# Patient Record
Sex: FEMALE | Race: WHITE | Employment: OTHER | URBAN - METROPOLITAN AREA
[De-identification: names, ages, dates, MRNs, and addresses within clinical notes are randomized per-mention and may not be internally consistent; named-entity substitution may affect disease eponyms.]

---

## 2017-04-06 ENCOUNTER — TELEPHONE (OUTPATIENT)
Dept: RADIATION ONCOLOGY | Facility: HOSPITAL | Age: 72
End: 2017-04-06

## 2017-04-06 ENCOUNTER — TELEPHONE (OUTPATIENT)
Dept: HEMATOLOGY/ONCOLOGY | Facility: HOSPITAL | Age: 72
End: 2017-04-06

## 2017-04-06 NOTE — TELEPHONE ENCOUNTER
I spoke to patients  and provided him with central scheduling phone number to call to schedule patients MRI that she will have this month. When asked he stated his wife has not complained about headaches or dizziness.

## 2017-04-06 NOTE — TELEPHONE ENCOUNTER
PLEASE CALL PT'S SPOUSE CONNIE -[phone] REGARDING IT'S BEEN 6 MOS SINCE PT'S RADIATION TREATMENTS AND   PT NEEDS ANOTHER BRAIN SCAN. -MKV

## 2017-06-01 ENCOUNTER — APPOINTMENT (OUTPATIENT)
Dept: RADIATION ONCOLOGY | Facility: HOSPITAL | Age: 72
End: 2017-06-01
Attending: RADIOLOGY
Payer: MEDICARE

## 2017-08-30 DIAGNOSIS — D32.9 MENINGIOMA (HCC): Primary | ICD-10-CM

## 2017-09-07 ENCOUNTER — HOSPITAL ENCOUNTER (OUTPATIENT)
Dept: MRI IMAGING | Facility: HOSPITAL | Age: 72
Discharge: HOME | End: 2017-09-07
Attending: RADIOLOGY
Payer: MEDICARE

## 2017-09-07 DIAGNOSIS — D32.9 MENINGIOMA (HCC): ICD-10-CM

## 2017-09-07 LAB — CREAT BLD-MCNC: 0.7 MG/DL (ref 0.5–1.5)

## 2017-09-07 PROCEDURE — 82565 ASSAY OF CREATININE: CPT

## 2017-09-07 PROCEDURE — A9575 INJ GADOTERATE MEGLUMI 0.1ML: HCPCS | Performed by: RADIOLOGY

## 2017-09-07 PROCEDURE — 70553 MRI BRAIN STEM W/O & W/DYE: CPT | Performed by: RADIOLOGY

## 2017-09-15 ENCOUNTER — OFFICE VISIT (OUTPATIENT)
Dept: RADIATION ONCOLOGY | Facility: HOSPITAL | Age: 72
End: 2017-09-15
Attending: RADIOLOGY
Payer: MEDICARE

## 2017-09-15 VITALS
WEIGHT: 125.63 LBS | SYSTOLIC BLOOD PRESSURE: 132 MMHG | TEMPERATURE: 98 F | OXYGEN SATURATION: 96 % | DIASTOLIC BLOOD PRESSURE: 65 MMHG | HEART RATE: 82 BPM | RESPIRATION RATE: 16 BRPM | BODY MASS INDEX: 22 KG/M2

## 2017-09-15 DIAGNOSIS — D32.9 MENINGIOMA (HCC): Primary | ICD-10-CM

## 2017-09-15 PROCEDURE — 99211 OFF/OP EST MAY X REQ PHY/QHP: CPT

## 2017-09-15 NOTE — PATIENT INSTRUCTIONS
Call [facility] line at [phone] for a referral for Primary Care Physician and Neurologist.   Next MRI is due in 6 year. Call to schedule at [phone].   Then call Serg Mathias at [phone] to schedule a follow up appointment to see Dr. Jude Clark.

## 2017-09-15 NOTE — PROGRESS NOTES
Pt came in accompanied by her family. Medical history reviewed. Medications reviewed. Pt vss. Denies any pain. Occasional headache. States excedrin  Helps with headache. Pt states she is eating fine but food at [facility] is not that good.   Son will hav

## 2017-09-15 NOTE — PROGRESS NOTES
RADIATION ONCOLOGY NOTE    DATE OF VISIT: 9/15/2017    DIAGNOSIS:  Atypical meningioma, status post resection, and adjuvant Cyberknife for residual disease in 7/13 and  further salvage Cyberknife Radiosurgery to L posterior parietal recurrent dz in 12/2016 unfortunately passed away and pt was admitted to [facility] due to seizures as she was likely not taking her medications. She has been on Vimpat instead of her Keppra with control of her symptoms.   Her most recent MRI shows tx response as below and she is curre below and she is currently at [place] and has good quality of life. The son expressed concern with regard to high cost of Vimpat (lacosamine) and will look into financial assistance programs for the medications or will f/u with Neurology RE Keppra. the left frontal and anterior temporal lobes is noted and likely   postsurgical in nature. Foci of hemosiderin deposition along these areas of encephalomalacia are also noted.  There is mild presumed reactive dural thickening and hyper enhancement at the cr ischemia. CEREBELLUM: No edema, hemorrhage, mass, or acute infarction is seen. There is cerebellar folial expansion consistent with atrophy.     BRAINSTEM:    There is a 0.7 x 0.5 cm left petrous ridge/cerebellopontine angle extra-axial dural based enhanci extraconal left orbit and   also extends posteriorly into the left middle cranial fossa. Posterior and medial extension to involve the left optic canal and superior orbital fissure is noted with additional abutment of the anterior left cavernous sinus.  Res

## 2018-02-22 ENCOUNTER — HOSPITAL ENCOUNTER (OUTPATIENT)
Dept: MRI IMAGING | Facility: HOSPITAL | Age: 73
Discharge: HOME | End: 2018-02-22
Attending: RADIOLOGY
Payer: MEDICARE

## 2018-02-22 DIAGNOSIS — D32.9 MENINGIOMA (HCC): ICD-10-CM

## 2018-02-22 LAB — CREAT BLD-MCNC: 0.7 MG/DL (ref 0.5–1.5)

## 2018-02-22 PROCEDURE — A9575 INJ GADOTERATE MEGLUMI 0.1ML: HCPCS | Performed by: RADIOLOGY

## 2018-02-22 PROCEDURE — 82565 ASSAY OF CREATININE: CPT

## 2018-02-22 PROCEDURE — 70553 MRI BRAIN STEM W/O & W/DYE: CPT | Performed by: RADIOLOGY

## 2018-02-23 DIAGNOSIS — D32.9 MENINGIOMA (HCC): Primary | ICD-10-CM

## 2018-02-28 ENCOUNTER — TELEPHONE (OUTPATIENT)
Dept: RADIATION ONCOLOGY | Facility: HOSPITAL | Age: 73
End: 2018-02-28

## 2018-03-07 ENCOUNTER — OFFICE VISIT (OUTPATIENT)
Dept: RADIATION ONCOLOGY | Facility: HOSPITAL | Age: 73
End: 2018-03-07
Attending: RADIOLOGY
Payer: MEDICARE

## 2018-03-07 VITALS
RESPIRATION RATE: 16 BRPM | BODY MASS INDEX: 20 KG/M2 | TEMPERATURE: 98 F | DIASTOLIC BLOOD PRESSURE: 73 MMHG | SYSTOLIC BLOOD PRESSURE: 158 MMHG | OXYGEN SATURATION: 95 % | WEIGHT: 116 LBS | HEART RATE: 68 BPM

## 2018-03-07 PROCEDURE — 99211 OFF/OP EST MAY X REQ PHY/QHP: CPT

## 2018-03-07 NOTE — PROGRESS NOTES
Pt came in accompanied by her son Isidro Phillips. Pt is living closer to Isidro Phillips at a facility now. Medical history reviewed. Medications reviewed. Pt vss. Denies any pain or headaches. Denies any recent seizure activity. Slow steady gait noted.   Eating and drin

## 2018-03-07 NOTE — PROGRESS NOTES
RADIATION ONCOLOGY NOTE    DATE OF VISIT: 3/7/2018    DIAGNOSIS:  Atypical meningioma, status post resection, and adjuvant Cyberknife for residual disease in 7/13 and  further salvage Cyberknife Radiosurgery to L posterior parietal recurrent dz in 12/2016, radiographic surveillance with stable dz in the treated areas, with post surgery findings seen. She has good QOL at her assisted facility and is compliant with her anti-seizure meds.   Of note, she does not have a primary MD.  She will be see Dr. Geri Benson mouth every 6 (six) hours as needed for Pain. Disp:  Rfl:    folic acid (FOLVITE) 1 MG Oral Tab Take by mouth daily. Disp:  Rfl:    levetiracetam (KEPPRA) 1000 MG Oral Tab Take 1,000 mg by mouth 2 (two) times daily.    Disp:  Rfl:        ALLERGIES :   No Kn Please do not hesitate to contact me directly.     Susie Brewer MD  Radiation Oncology  [email or web address]  [phone].    3/7/2018      ==  Show images for MRI BRAIN (W+WO) ([id number])    Printable Result Report     Open Hard Copy Result Report (Order #[id number] - sphenoid wing that spans approximately 5.0 x 1.9 (AP by transverse). Extension into the left middle cranial fossa is noted as well as left orbital apex.    Intraorbital extension through the sphenoid bone and orbital roof is noted with abutment of the left identified but appear slightly less conspicuous and masslike. Dictated by (CST): Tolu Maradiaga MD on 2/22/2018 at 15:09       Approved by (CST): Tolu Maradiaga MD on 2/22/2018 at 15:26          =====  CONCLUSION:   1.  Postsurgical changes of left cr

## 2018-08-22 ENCOUNTER — HOSPITAL ENCOUNTER (OUTPATIENT)
Dept: MRI IMAGING | Facility: HOSPITAL | Age: 73
Discharge: HOME | End: 2018-08-22
Attending: RADIOLOGY
Payer: MEDICARE

## 2018-08-22 DIAGNOSIS — D32.9 MENINGIOMA (HCC): ICD-10-CM

## 2018-08-22 LAB — CREAT BLD-MCNC: 0.7 MG/DL (ref 0.5–1.5)

## 2018-08-22 PROCEDURE — 82565 ASSAY OF CREATININE: CPT

## 2018-08-22 PROCEDURE — A9576 INJ PROHANCE MULTIPACK: HCPCS | Performed by: RADIOLOGY

## 2018-08-22 PROCEDURE — 70553 MRI BRAIN STEM W/O & W/DYE: CPT | Performed by: RADIOLOGY

## 2019-05-16 ENCOUNTER — TELEPHONE (OUTPATIENT)
Dept: RADIATION ONCOLOGY | Facility: HOSPITAL | Age: 74
End: 2019-05-16

## 2019-05-16 NOTE — TELEPHONE ENCOUNTER
Multiple messages left for patient's son Geetha Michaels (5/2,5/9,5/15) to set up f/u appointment with Dr. Jude Clark.  Patient was also instructed to have MRI post treatment prior to f/u. No response from son.   Will inform Dr. Jude Clark.

## 2019-05-24 ENCOUNTER — TELEPHONE (OUTPATIENT)
Dept: RADIATION ONCOLOGY | Facility: HOSPITAL | Age: 74
End: 2019-05-24

## 2019-05-24 NOTE — TELEPHONE ENCOUNTER
Multiple calls ( 5/2, 5/9, 5/16, 5/24) to son Ned Dillon to schedule MRI and then a f/u with Dr. Jude ROBBINS each time with no response.

## 2019-06-04 ENCOUNTER — HOSPITAL ENCOUNTER (OUTPATIENT)
Dept: MRI IMAGING | Facility: HOSPITAL | Age: 74
Discharge: HOME | End: 2019-06-04
Attending: RADIOLOGY
Payer: MEDICARE

## 2019-06-04 DIAGNOSIS — D32.9 BENIGN NEOPLASM OF MENINGES (HCC): ICD-10-CM

## 2019-06-04 PROCEDURE — A9575 INJ GADOTERATE MEGLUMI 0.1ML: HCPCS | Performed by: RADIOLOGY

## 2019-06-04 PROCEDURE — 82565 ASSAY OF CREATININE: CPT

## 2019-06-04 PROCEDURE — 70553 MRI BRAIN STEM W/O & W/DYE: CPT | Performed by: RADIOLOGY

## 2019-06-05 ENCOUNTER — TELEPHONE (OUTPATIENT)
Dept: HEMATOLOGY/ONCOLOGY | Facility: HOSPITAL | Age: 74
End: 2019-06-05

## 2019-06-05 NOTE — TELEPHONE ENCOUNTER
lvmtcb for son Sesar Cowan to make f/u ck appt and review MRI.  [address] [facility]

## 2019-06-05 NOTE — TELEPHONE ENCOUNTER
----- Message from Mason Robert RN sent at 6/4/2019  1:27 PM CDT -----  Regarding: [facility] PT  Please call to schedule a follow up to review MRI results, yearly visit.

## 2019-06-12 ENCOUNTER — OFFICE VISIT (OUTPATIENT)
Dept: RADIATION ONCOLOGY | Facility: HOSPITAL | Age: 74
End: 2019-06-12
Attending: RADIOLOGY
Payer: MEDICARE

## 2019-06-12 VITALS
OXYGEN SATURATION: 98 % | TEMPERATURE: 98 F | WEIGHT: 91.81 LBS | RESPIRATION RATE: 16 BRPM | BODY MASS INDEX: 16 KG/M2 | SYSTOLIC BLOOD PRESSURE: 140 MMHG | HEART RATE: 70 BPM | DIASTOLIC BLOOD PRESSURE: 67 MMHG

## 2019-06-12 DIAGNOSIS — D32.9 MENINGIOMA (HCC): Primary | ICD-10-CM

## 2019-06-12 PROCEDURE — 99211 OFF/OP EST MAY X REQ PHY/QHP: CPT

## 2019-06-12 NOTE — PROGRESS NOTES
Pt came in accompanied by her son. Pt has lost 25 lbs since the last time she was seen in our office. Medical history reviewed. Medications reviewed. Pt vss.   Denies any pain, states sometimes she has headaches and takes her prescription tylenol for arash

## 2019-06-12 NOTE — PROGRESS NOTES
RADIATION ONCOLOGY NOTE    DATE OF VISIT: 6/12/2019    DIAGNOSIS:  Atypical meningioma, status post resection, and adjuvant Cyberknife for residual disease in 7/13 and  further salvage Cyberknife Radiosurgery to L posterior parietal recurrent dz in 12/2016 radiographic surveillance with stable dz in the treated areas, with post surgery findings seen. Of note, she reports having bowel resection but has decreased appetitie with progressive weight loss.   She is at a assisted facility and reports only eating he patient's clinical, radiographic, pathologic and laboratory studies.     ASSESSMENT/PLAN    70 yo with  Atypical meningioma, status post resection, and adjuvant Cyberknife for residual disease in 7/13 and  further salvage Cyberknife Radiosurgery to L poster Dictated by (CST): Aparna Porras MD on 6/04/2019 at 11:56     Approved by (CST): Aparna Porras MD on 6/04/2019 at 13:02

## 2020-06-08 ENCOUNTER — HOSPITAL ENCOUNTER (OUTPATIENT)
Dept: MRI IMAGING | Facility: HOSPITAL | Age: 75
Discharge: HOME | End: 2020-06-08
Attending: RADIOLOGY
Payer: MEDICARE

## 2020-06-08 DIAGNOSIS — D32.9 MENINGIOMA (HCC): ICD-10-CM

## 2020-06-08 PROCEDURE — 70553 MRI BRAIN STEM W/O & W/DYE: CPT | Performed by: RADIOLOGY

## 2020-06-08 PROCEDURE — A9575 INJ GADOTERATE MEGLUMI 0.1ML: HCPCS | Performed by: RADIOLOGY

## 2020-07-09 ENCOUNTER — TELEPHONE (OUTPATIENT)
Dept: RADIATION ONCOLOGY | Facility: HOSPITAL | Age: 75
End: 2020-07-09

## 2020-07-22 ENCOUNTER — APPOINTMENT (OUTPATIENT)
Dept: RADIATION ONCOLOGY | Facility: HOSPITAL | Age: 75
End: 2020-07-22
Attending: RADIOLOGY
Payer: MEDICARE

## 2020-07-22 VITALS
SYSTOLIC BLOOD PRESSURE: 166 MMHG | TEMPERATURE: 98 F | HEART RATE: 71 BPM | WEIGHT: 98.81 LBS | DIASTOLIC BLOOD PRESSURE: 76 MMHG | OXYGEN SATURATION: 98 % | RESPIRATION RATE: 16 BRPM | BODY MASS INDEX: 17 KG/M2

## 2020-07-22 DIAGNOSIS — D32.9 MENINGIOMA (HCC): Primary | ICD-10-CM

## 2020-07-22 PROCEDURE — 77470 SPECIAL RADIATION TREATMENT: CPT | Performed by: RADIOLOGY

## 2020-07-22 PROCEDURE — 77334 RADIATION TREATMENT AID(S): CPT | Performed by: RADIOLOGY

## 2020-07-22 PROCEDURE — 77290 THER RAD SIMULAJ FIELD CPLX: CPT | Performed by: RADIOLOGY

## 2020-07-22 PROCEDURE — 99212 OFFICE O/P EST SF 10 MIN: CPT

## 2020-07-22 NOTE — PROGRESS NOTES
Nursing Consultation Note  Patient: Jamar Braga  YOB: 1945  Age: 70year old  Radiation Oncologist: Dr. jR Faust  Referring Physician: Lc Ponce  Diagnosis:No diagnosis found.   Consult Date: 7/22/2020      Chemotherapy: No Date   • Meningioma [facility])        History reviewed. No pertinent surgical history. Social History    Socioeconomic History      Marital status:        Spouse name: Not on file      Number of children: Not on file      Years of education: Not on torito requested.   Transportation:  Adequate transportation available for expected visits    Pain:   ;Pain Score: 0   ;    ;          Safety Plan Of Care:    Safety Problem:  Risk of injury  Risk of fall    Related to:    Diminished physical activity    General S

## 2020-07-22 NOTE — PROGRESS NOTES
RADIATION ONCOLOGY NOTE    DATE OF VISIT: 7/22/2020    DIAGNOSIS:  Atypical meningioma, status post resection, and adjuvant Cyberknife for residual disease in 7/13 and  further salvage Cyberknife Radiosurgery to L posterior parietal recurrent dz in 12/2016 6/2019 and meanwhile had proptosis and had delay to w/u due to Everardo Bolivar. Unfortunately her MRI revealed radiographic progression as below. This was reviewed with her son today and previously with her other son in [place] by telephone.   She has di (150 lb 3.2 oz)       PHYSICAL EXAM  Blood pressure (!) 166/76, pulse 71, temperature 97.9 °F (36.6 °C), temperature source Temporal, resp. rate 16, weight 44.8 kg (98 lb 12.8 oz), SpO2 98 %.   PERFORMANCE STATUS  1,  2/10 PAIN  GENERAL:  Pt is alert and or

## 2020-07-27 PROCEDURE — 77295 3-D RADIOTHERAPY PLAN: CPT | Performed by: RADIOLOGY

## 2020-08-01 ENCOUNTER — APPOINTMENT (OUTPATIENT)
Dept: RADIATION ONCOLOGY | Facility: HOSPITAL | Age: 75
End: 2020-08-01
Attending: RADIOLOGY
Payer: MEDICARE

## 2020-08-03 ENCOUNTER — LAB ENCOUNTER (OUTPATIENT)
Dept: LAB | Facility: HOSPITAL | Age: 75
End: 2020-08-03
Attending: RADIOLOGY
Payer: MEDICARE

## 2020-08-03 DIAGNOSIS — Z01.812 PRE-PROCEDURE LAB EXAM: Primary | ICD-10-CM

## 2020-08-04 LAB — SARS-COV-2 RNA RESP QL NAA+PROBE: NOT DETECTED

## 2020-08-05 ENCOUNTER — APPOINTMENT (OUTPATIENT)
Dept: RADIATION ONCOLOGY | Facility: HOSPITAL | Age: 75
End: 2020-08-05
Attending: RADIOLOGY
Payer: MEDICARE

## 2020-08-05 PROCEDURE — 77334 RADIATION TREATMENT AID(S): CPT | Performed by: RADIOLOGY

## 2020-08-05 PROCEDURE — 77300 RADIATION THERAPY DOSE PLAN: CPT | Performed by: RADIOLOGY

## 2020-08-05 PROCEDURE — 77372 SRS LINEAR BASED: CPT | Performed by: RADIOLOGY

## 2020-08-05 PROCEDURE — 77280 THER RAD SIMULAJ FIELD SMPL: CPT | Performed by: RADIOLOGY

## 2020-08-06 ENCOUNTER — OFFICE VISIT (OUTPATIENT)
Dept: RADIATION ONCOLOGY | Facility: HOSPITAL | Age: 75
End: 2020-08-06
Attending: RADIOLOGY
Payer: MEDICARE

## 2020-08-06 VITALS
RESPIRATION RATE: 18 BRPM | DIASTOLIC BLOOD PRESSURE: 79 MMHG | HEART RATE: 88 BPM | SYSTOLIC BLOOD PRESSURE: 157 MMHG | TEMPERATURE: 98 F

## 2020-08-06 DIAGNOSIS — D32.9 MENINGIOMA (HCC): Primary | ICD-10-CM

## 2020-08-06 PROCEDURE — 77373 STRTCTC BDY RAD THER TX DLVR: CPT | Performed by: RADIOLOGY

## 2020-08-06 NOTE — PATIENT INSTRUCTIONS
Please call [phone] once the MRI is scheduled and schedule a follow up appointment with Dr Hailey Santos.     Please apply eye patch.

## 2020-08-07 ENCOUNTER — APPOINTMENT (OUTPATIENT)
Dept: RADIATION ONCOLOGY | Facility: HOSPITAL | Age: 75
End: 2020-08-07
Attending: RADIOLOGY
Payer: MEDICARE

## 2020-08-07 PROCEDURE — 77373 STRTCTC BDY RAD THER TX DLVR: CPT | Performed by: RADIOLOGY

## 2020-08-10 ENCOUNTER — APPOINTMENT (OUTPATIENT)
Dept: RADIATION ONCOLOGY | Facility: HOSPITAL | Age: 75
End: 2020-08-10
Attending: RADIOLOGY
Payer: MEDICARE

## 2020-08-11 ENCOUNTER — APPOINTMENT (OUTPATIENT)
Dept: RADIATION ONCOLOGY | Facility: HOSPITAL | Age: 75
End: 2020-08-11
Attending: RADIOLOGY
Payer: MEDICARE

## 2020-08-11 PROCEDURE — 77373 STRTCTC BDY RAD THER TX DLVR: CPT | Performed by: RADIOLOGY

## 2020-08-12 ENCOUNTER — APPOINTMENT (OUTPATIENT)
Dept: RADIATION ONCOLOGY | Facility: HOSPITAL | Age: 75
End: 2020-08-12
Attending: RADIOLOGY
Payer: MEDICARE

## 2020-08-13 NOTE — PROGRESS NOTES
RADIATION ONCOLOGY COMPLETION SUMMARY NOTE    DIAGNOSIS:  Atypical meningioma, status post resection, and adjuvant Cyberknife for residual disease in 7/13 and  further salvage Cyberknife Radiosurgery to L posterior parietal recurrent dz in 12/2016, with ra radiographic f/u with previous stable MRI from 6/2019 and meanwhile had proptosis and had delay to w/u due to Everardo Bolivar.   Unfortunately her MRI revealed radiographic progression with enlarging L supraorbital mass with ptosis and visual loss, diplopi

## 2020-08-14 PROCEDURE — 77336 RADIATION PHYSICS CONSULT: CPT | Performed by: RADIOLOGY

## 2020-08-14 PROCEDURE — 77373 STRTCTC BDY RAD THER TX DLVR: CPT | Performed by: RADIOLOGY

## 2020-11-19 ENCOUNTER — APPOINTMENT (OUTPATIENT)
Dept: RADIATION ONCOLOGY | Facility: HOSPITAL | Age: 75
End: 2020-11-19
Attending: RADIOLOGY
Payer: MEDICARE

## 2020-11-20 ENCOUNTER — APPOINTMENT (OUTPATIENT)
Dept: RADIATION ONCOLOGY | Facility: HOSPITAL | Age: 75
End: 2020-11-20
Attending: RADIOLOGY
Payer: MEDICARE

## 2020-12-03 ENCOUNTER — TELEPHONE (OUTPATIENT)
Dept: RADIATION ONCOLOGY | Facility: HOSPITAL | Age: 75
End: 2020-12-03

## 2021-02-22 ENCOUNTER — TELEPHONE (OUTPATIENT)
Dept: RADIATION ONCOLOGY | Facility: HOSPITAL | Age: 76
End: 2021-02-22

## 2021-02-22 NOTE — TELEPHONE ENCOUNTER
M for son Shaneka Fung to call and set up appt with Dr. Cayetano Deras, and have MRI prior to visit.

## 2021-03-03 ENCOUNTER — TELEPHONE (OUTPATIENT)
Dept: RADIATION ONCOLOGY | Facility: HOSPITAL | Age: 76
End: 2021-03-03

## 2021-03-03 NOTE — TELEPHONE ENCOUNTER
M for son Ned Dillon regarding f/u MRI and visit with Dr. Jude Clark, which was due in February. Asked that he call and schedule.

## 2021-03-15 ENCOUNTER — TELEPHONE (OUTPATIENT)
Dept: RADIATION ONCOLOGY | Facility: HOSPITAL | Age: 76
End: 2021-03-15

## 2021-03-15 NOTE — TELEPHONE ENCOUNTER
M for son Anselmo Bright to call back to determine if his mother, Julio Briseno, will be having f/u MRI which was due in February. Dr. Randall Tomas can call with results. Asked him to call back.

## 2021-03-17 ENCOUNTER — TELEPHONE (OUTPATIENT)
Dept: RADIATION ONCOLOGY | Facility: HOSPITAL | Age: 76
End: 2021-03-17

## 2021-03-17 NOTE — TELEPHONE ENCOUNTER
LVM for son Michaela Rodrigues who arranges appointments for his mother. His phone just went to a busy signal and I have had no responses to previous messages. Patient last seen by Dr. Anton Soto in August of 2020. No further calls to son f/u.   Patient will need to call the

## 2021-03-22 DIAGNOSIS — D32.9 MENINGIOMA (HCC): Primary | ICD-10-CM

## 2021-04-07 ENCOUNTER — HOSPITAL ENCOUNTER (OUTPATIENT)
Dept: MRI IMAGING | Facility: HOSPITAL | Age: 76
Discharge: HOME | End: 2021-04-07
Attending: RADIOLOGY
Payer: MEDICARE

## 2021-04-07 DIAGNOSIS — D32.9 MENINGIOMA (HCC): ICD-10-CM

## 2021-04-07 PROCEDURE — 70553 MRI BRAIN STEM W/O & W/DYE: CPT | Performed by: RADIOLOGY

## 2021-04-07 PROCEDURE — 82565 ASSAY OF CREATININE: CPT

## 2021-04-07 PROCEDURE — A9575 INJ GADOTERATE MEGLUMI 0.1ML: HCPCS | Performed by: RADIOLOGY

## 2021-04-09 ENCOUNTER — APPOINTMENT (OUTPATIENT)
Dept: RADIATION ONCOLOGY | Facility: HOSPITAL | Age: 76
End: 2021-04-09
Attending: RADIOLOGY
Payer: MEDICARE

## 2022-06-04 NOTE — PROGRESS NOTES
I got a call around 8:20 PM and inform patient's blood sugars over 400.  Moved patient from moderate high sliding scale to high sliding scale.  I then received a text message around 12:30 AM stating that patient's blood sugar never over 200.  Patient sliding scale then reverted back to moderate high level.   [facility] Radiation Treatment Management Note 1-5    Patient:  Ratna Fuller  Age:  70year old  Visit Diagnosis:  No diagnosis found.   Primary Rad/Onc:  Dr. Rex Minor    Site Delivered Dose (Gy) Prescribed Dose (Gy) Fraction #   L